# Patient Record
Sex: FEMALE | Race: WHITE | Employment: OTHER | URBAN - METROPOLITAN AREA
[De-identification: names, ages, dates, MRNs, and addresses within clinical notes are randomized per-mention and may not be internally consistent; named-entity substitution may affect disease eponyms.]

---

## 2017-08-26 ENCOUNTER — HOSPITAL ENCOUNTER (EMERGENCY)
Age: 67
Discharge: HOME | End: 2017-08-27
Attending: EMERGENCY MEDICINE | Admitting: EMERGENCY MEDICINE
Payer: MEDICARE

## 2017-08-26 DIAGNOSIS — R51.9 ACUTE NONINTRACTABLE HEADACHE, UNSPECIFIED HEADACHE TYPE: Primary | ICD-10-CM

## 2017-08-26 PROCEDURE — 96375 TX/PRO/DX INJ NEW DRUG ADDON: CPT

## 2017-08-26 PROCEDURE — 74011250636 HC RX REV CODE- 250/636: Performed by: EMERGENCY MEDICINE

## 2017-08-26 PROCEDURE — 74011000258 HC RX REV CODE- 258: Performed by: EMERGENCY MEDICINE

## 2017-08-26 PROCEDURE — 96361 HYDRATE IV INFUSION ADD-ON: CPT

## 2017-08-26 PROCEDURE — 99284 EMERGENCY DEPT VISIT MOD MDM: CPT

## 2017-08-26 PROCEDURE — 96365 THER/PROPH/DIAG IV INF INIT: CPT

## 2017-08-26 PROCEDURE — 74011000250 HC RX REV CODE- 250: Performed by: EMERGENCY MEDICINE

## 2017-08-26 RX ORDER — NIACIN 50 MG
TABLET ORAL
COMMUNITY

## 2017-08-26 RX ORDER — METOCLOPRAMIDE 5 MG/1
5 TABLET ORAL
COMMUNITY

## 2017-08-26 RX ORDER — CARVEDILOL 25 MG/1
25 TABLET ORAL 2 TIMES DAILY WITH MEALS
COMMUNITY

## 2017-08-26 RX ORDER — SERTRALINE HYDROCHLORIDE 50 MG/1
TABLET, FILM COATED ORAL DAILY
COMMUNITY

## 2017-08-26 RX ORDER — ATORVASTATIN CALCIUM 40 MG/1
40 TABLET, FILM COATED ORAL DAILY
COMMUNITY

## 2017-08-26 RX ORDER — SODIUM CHLORIDE 0.9 % (FLUSH) 0.9 %
5-10 SYRINGE (ML) INJECTION EVERY 8 HOURS
Status: DISCONTINUED | OUTPATIENT
Start: 2017-08-26 | End: 2017-08-27 | Stop reason: HOSPADM

## 2017-08-26 RX ORDER — BUMETANIDE 1 MG/1
TABLET ORAL DAILY
COMMUNITY

## 2017-08-26 RX ORDER — DEXAMETHASONE SODIUM PHOSPHATE 10 MG/ML
10 INJECTION INTRAMUSCULAR; INTRAVENOUS
Status: COMPLETED | OUTPATIENT
Start: 2017-08-26 | End: 2017-08-26

## 2017-08-26 RX ORDER — DIPHENHYDRAMINE HYDROCHLORIDE 50 MG/ML
25 INJECTION, SOLUTION INTRAMUSCULAR; INTRAVENOUS ONCE
Status: COMPLETED | OUTPATIENT
Start: 2017-08-26 | End: 2017-08-26

## 2017-08-26 RX ORDER — PROCHLORPERAZINE EDISYLATE 5 MG/ML
10 INJECTION INTRAMUSCULAR; INTRAVENOUS
Status: COMPLETED | OUTPATIENT
Start: 2017-08-26 | End: 2017-08-26

## 2017-08-26 RX ORDER — FLUOXETINE HYDROCHLORIDE 40 MG/1
40 CAPSULE ORAL DAILY
COMMUNITY

## 2017-08-26 RX ORDER — ALLOPURINOL 300 MG/1
TABLET ORAL DAILY
COMMUNITY

## 2017-08-26 RX ORDER — VALPROATE SODIUM 100 MG/ML
500 INJECTION INTRAVENOUS
Status: DISCONTINUED | OUTPATIENT
Start: 2017-08-26 | End: 2017-08-26 | Stop reason: CLARIF

## 2017-08-26 RX ORDER — SODIUM CHLORIDE 0.9 % (FLUSH) 0.9 %
5-10 SYRINGE (ML) INJECTION AS NEEDED
Status: DISCONTINUED | OUTPATIENT
Start: 2017-08-26 | End: 2017-08-27 | Stop reason: HOSPADM

## 2017-08-26 RX ORDER — KETOROLAC TROMETHAMINE 30 MG/ML
15 INJECTION, SOLUTION INTRAMUSCULAR; INTRAVENOUS
Status: COMPLETED | OUTPATIENT
Start: 2017-08-26 | End: 2017-08-26

## 2017-08-26 RX ORDER — ALPRAZOLAM 1 MG/1
TABLET ORAL
COMMUNITY

## 2017-08-26 RX ORDER — POTASSIUM CHLORIDE 1.5 G/1.77G
20 POWDER, FOR SOLUTION ORAL 2 TIMES DAILY
COMMUNITY

## 2017-08-26 RX ORDER — BUPROPION HYDROCHLORIDE 100 MG/1
TABLET ORAL
COMMUNITY

## 2017-08-26 RX ADMIN — Medication 10 ML: at 23:51

## 2017-08-26 RX ADMIN — PROCHLORPERAZINE EDISYLATE 10 MG: 5 INJECTION INTRAMUSCULAR; INTRAVENOUS at 21:58

## 2017-08-26 RX ADMIN — SODIUM CHLORIDE 1000 ML: 900 INJECTION, SOLUTION INTRAVENOUS at 21:52

## 2017-08-26 RX ADMIN — Medication 10 ML: at 21:58

## 2017-08-26 RX ADMIN — VALPROATE SODIUM 500 MG: 100 INJECTION, SOLUTION INTRAVENOUS at 22:38

## 2017-08-26 RX ADMIN — KETOROLAC TROMETHAMINE 15 MG: 30 INJECTION, SOLUTION INTRAMUSCULAR at 21:53

## 2017-08-26 RX ADMIN — DIPHENHYDRAMINE HYDROCHLORIDE 25 MG: 50 INJECTION, SOLUTION INTRAMUSCULAR; INTRAVENOUS at 21:55

## 2017-08-26 RX ADMIN — DEXAMETHASONE SODIUM PHOSPHATE 10 MG: 10 INJECTION, SOLUTION INTRAMUSCULAR; INTRAVENOUS at 23:50

## 2017-08-27 VITALS
TEMPERATURE: 98.3 F | WEIGHT: 250 LBS | RESPIRATION RATE: 18 BRPM | SYSTOLIC BLOOD PRESSURE: 112 MMHG | BODY MASS INDEX: 39.24 KG/M2 | HEART RATE: 72 BPM | DIASTOLIC BLOOD PRESSURE: 50 MMHG | HEIGHT: 67 IN | OXYGEN SATURATION: 96 %

## 2017-08-27 NOTE — ED PROVIDER NOTES
HPI Comments: 78 y.o. female with past medical history significant for cluster migraines, PNA who presents accompanied by  with chief complaint of headache. Patient complains of an intermittent migraine across her forehead and into her cheeks with some light sensitivity that has been ongoing for 2 weeks. Patient states she's been taking Topamax, Fioricet, and Hydrocodone without relief. Patient states she last took a Fioricet around 1730 and a Topamax around 1800. Patient states she's called her neurologist twice without success. Patient states she's also vomited a few times and is currently feeling nauseated. Patient states she had migraines a few years ago then recently was diagnosed with cluster migraines after a head CT with her neurologist. Patient states she had PNA 3 weeks ago. Patient denies numbness into her extremities or changes in her vision. There are no other acute medical concerns at this time. PCP: No primary care provider on file. Neurologist: Kiana Jones MD    Note written by Cliff Delgado, as dictated by Raffaele Keller MD 9:28 PM     The history is provided by the patient. No  was used. No past medical history on file. No past surgical history on file. No family history on file. Social History     Social History    Marital status:      Spouse name: N/A    Number of children: N/A    Years of education: N/A     Occupational History    Not on file. Social History Main Topics    Smoking status: Not on file    Smokeless tobacco: Not on file    Alcohol use Not on file    Drug use: Not on file    Sexual activity: Not on file     Other Topics Concern    Not on file     Social History Narrative    No narrative on file         ALLERGIES: Latex; Lisinopril; and Oxycodone    Review of Systems   Constitutional: Negative for appetite change, chills, fatigue and fever. HENT: Negative for congestion, rhinorrhea and sore throat. Eyes: Positive for photophobia. Negative for visual disturbance. Respiratory: Negative for cough and shortness of breath. Cardiovascular: Negative for chest pain and leg swelling. Gastrointestinal: Positive for nausea and vomiting. Negative for abdominal pain, constipation and diarrhea. Genitourinary: Negative for difficulty urinating and dysuria. Musculoskeletal: Negative for back pain and neck pain. Skin: Negative for rash and wound. Neurological: Positive for headaches. All other systems reviewed and are negative. Vitals:    08/26/17 2116   BP: (!) 196/92   Pulse: 72   Resp: 18   Temp: 98.3 °F (36.8 °C)   SpO2: 97%   Weight: 113.4 kg (250 lb)   Height: 5' 7\" (1.702 m)            Physical Exam   Constitutional: She is oriented to person, place, and time. She appears well-developed and well-nourished. Overweight  Appears uncomfortable   HENT:   Head: Normocephalic and atraumatic. Eyes: Conjunctivae are normal. No scleral icterus. Neck: Neck supple. No tracheal deviation present. Cardiovascular: Normal rate, regular rhythm, normal heart sounds and intact distal pulses. Exam reveals no gallop and no friction rub. No murmur heard. Pulmonary/Chest: Effort normal and breath sounds normal. She has no wheezes. She has no rales. Abdominal: Soft. She exhibits no distension. There is no tenderness. There is no rebound and no guarding. Musculoskeletal: She exhibits no edema. Neurological: She is alert and oriented to person, place, and time. She has normal strength. No cranial nerve deficit or sensory deficit. Skin: Skin is warm and dry. No rash noted. Psychiatric: She has a normal mood and affect. Nursing note and vitals reviewed. Note written by Cliff Garcia, as dictated by Cecil Wang MD 9:29 PM      [facility]  ED Course       Procedures    10:19 PM  Change of shift. Care of patient to be signed over to Dr. Jaya Keenan.  Cecil Wang MD  10:19 PM    A/P: migraine with hx same - on Topamax at home; also has tried Fioricet and hydrocodone; has not gotten relief yet from Compazine, Benadryl, Toradol. I've ordered Depacon. .  Reassuring exam.  Jaime Iglesias MD  10:21 PM

## 2017-08-27 NOTE — ED TRIAGE NOTES
Pt presents with migraine x2 weeks that is worsening. Reports that she has had no relief with Topamax, Fioricet, or hydrocodone.

## 2017-08-27 NOTE — DISCHARGE INSTRUCTIONS
Headache: Care Instructions  Your Care Instructions    Headaches have many possible causes. Most headaches aren't a sign of a more serious problem, and they will get better on their own. Home treatment may help you feel better faster. The doctor has checked you carefully, but problems can develop later. If you notice any problems or new symptoms, get medical treatment right away. Follow-up care is a key part of your treatment and safety. Be sure to make and go to all appointments, and call your doctor if you are having problems. It's also a good idea to know your test results and keep a list of the medicines you take. How can you care for yourself at home? · Do not drive if you have taken a prescription pain medicine. · Rest in a quiet, dark room until your headache is gone. Close your eyes and try to relax or go to sleep. Don't watch TV or read. · Put a cold, moist cloth or cold pack on the painful area for 10 to 20 minutes at a time. Put a thin cloth between the cold pack and your skin. · Use a warm, moist towel or a heating pad set on low to relax tight shoulder and neck muscles. · Have someone gently massage your neck and shoulders. · Take pain medicines exactly as directed. ¨ If the doctor gave you a prescription medicine for pain, take it as prescribed. ¨ If you are not taking a prescription pain medicine, ask your doctor if you can take an over-the-counter medicine. · Be careful not to take pain medicine more often than the instructions allow, because you may get worse or more frequent headaches when the medicine wears off. · Do not ignore new symptoms that occur with a headache, such as a fever, weakness or numbness, vision changes, or confusion. These may be signs of a more serious problem. To prevent headaches  · Keep a headache diary so you can figure out what triggers your headaches. Avoiding triggers may help you prevent headaches.  Record when each headache began, how long it lasted, and what the pain was like (throbbing, aching, stabbing, or dull). Write down any other symptoms you had with the headache, such as nausea, flashing lights or dark spots, or sensitivity to bright light or loud noise. Note if the headache occurred near your period. List anything that might have triggered the headache, such as certain foods (chocolate, cheese, wine) or odors, smoke, bright light, stress, or lack of sleep. · Find healthy ways to deal with stress. Headaches are most common during or right after stressful times. Take time to relax before and after you do something that has caused a headache in the past.  · Try to keep your muscles relaxed by keeping good posture. Check your jaw, face, neck, and shoulder muscles for tension, and try relaxing them. When sitting at a desk, change positions often, and stretch for 30 seconds each hour. · Get plenty of sleep and exercise. · Eat regularly and well. Long periods without food can trigger a headache. · Treat yourself to a massage. Some people find that regular massages are very helpful in relieving tension. · Limit caffeine by not drinking too much coffee, tea, or soda. But don't quit caffeine suddenly, because that can also give you headaches. · Reduce eyestrain from computers by blinking frequently and looking away from the computer screen every so often. Make sure you have proper eyewear and that your monitor is set up properly, about an arm's length away. · Seek help if you have depression or anxiety. Your headaches may be linked to these conditions. Treatment can both prevent headaches and help with symptoms of anxiety or depression. When should you call for help? Call 911 anytime you think you may need emergency care. For example, call if:  · You have signs of a stroke. These may include:  ¨ Sudden numbness, paralysis, or weakness in your face, arm, or leg, especially on only one side of your body. ¨ Sudden vision changes.   ¨ Sudden trouble speaking. ¨ Sudden confusion or trouble understanding simple statements. ¨ Sudden problems with walking or balance. ¨ A sudden, severe headache that is different from past headaches. Call your doctor now or seek immediate medical care if:  · You have a new or worse headache. · Your headache gets much worse. Where can you learn more? Go to http://rudolph-jesusita.info/. Enter M271 in the search box to learn more about \"Headache: Care Instructions. \"  Current as of: October 14, 2016  Content Version: 11.3  © 8046-9223 [employer]. Care instructions adapted under license by [employer] (which disclaims liability or warranty for this information). If you have questions about a medical condition or this instruction, always ask your healthcare professional. [address] any warranty or liability for your use of this information. We hope that we have addressed all of your medical concerns. The examination and treatment you received in the Emergency Department were for an emergent problem and were not intended as complete care. It is important that you follow up with your healthcare provider(s) for ongoing care. If your symptoms worsen or do not improve as expected, and you are unable to reach your usual health care provider(s), you should return to the Emergency Department. Today's healthcare is undergoing tremendous change, and patient satisfaction surveys are one of the many tools to assess the quality of medical care. You may receive a survey from the [employer] regarding your experience in the Emergency Department. I hope that your experience has been completely positive, particularly the medical care that I provided. As such, please participate in the survey; anything less than excellent does not meet my expectations or intentions.         [facility] and [address] [place] [address] participate in nationally recognized quality of care measures. If your blood pressure is greater than 120/80, as reported below, we urge that you seek medical care to address the potential of high blood pressure, commonly known as hypertension. Hypertension can be hereditary or can be caused by certain medical conditions, pain, stress, or \"white coat syndrome. \"       Please make an appointment with your health care provider(s) for follow up of your Emergency Department visit. VITALS:   Patient Vitals for the past 8 hrs:   Temp Pulse Resp BP SpO2   08/26/17 2300 - - - 126/65 96 %   08/26/17 2230 - - - 124/63 96 %   08/26/17 2116 98.3 °F (36.8 °C) 72 18 (!) 196/92 97 %          Thank you for allowing us to provide you with medical care today. We realize that you have many choices for your emergency care needs. Please choose us in the future for any continued health care needs. Rom Gutiérrez [facility], [address] [place] [address] Avenue: [phone]            No results found for this or any previous visit (from the past 24 hour(s)). No results found.

## 2017-09-06 ENCOUNTER — OFFICE VISIT (OUTPATIENT)
Dept: ONCOLOGY | Age: 67
End: 2017-09-06

## 2017-09-06 ENCOUNTER — HOSPITAL ENCOUNTER (OUTPATIENT)
Dept: GENERAL RADIOLOGY | Age: 67
Discharge: HOME | End: 2017-09-06
Payer: MEDICARE

## 2017-09-06 VITALS
OXYGEN SATURATION: 97 % | DIASTOLIC BLOOD PRESSURE: 66 MMHG | BODY MASS INDEX: 40.46 KG/M2 | WEIGHT: 257.8 LBS | SYSTOLIC BLOOD PRESSURE: 150 MMHG | HEART RATE: 67 BPM | HEIGHT: 67 IN

## 2017-09-06 DIAGNOSIS — N18.9 CKD (CHRONIC KIDNEY DISEASE), UNSPECIFIED STAGE: ICD-10-CM

## 2017-09-06 DIAGNOSIS — D47.2 MGUS (MONOCLONAL GAMMOPATHY OF UNKNOWN SIGNIFICANCE): Primary | ICD-10-CM

## 2017-09-06 DIAGNOSIS — R80.9 PROTEINURIA, UNSPECIFIED TYPE: ICD-10-CM

## 2017-09-06 DIAGNOSIS — D47.2 MGUS (MONOCLONAL GAMMOPATHY OF UNKNOWN SIGNIFICANCE): ICD-10-CM

## 2017-09-06 PROCEDURE — 77075 RADEX OSSEOUS SURVEY COMPL: CPT

## 2017-09-06 RX ORDER — AMLODIPINE BESYLATE 10 MG/1
TABLET ORAL
COMMUNITY
Start: 2017-07-15

## 2017-09-06 RX ORDER — BUPROPION HYDROCHLORIDE 100 MG/1
TABLET, EXTENDED RELEASE ORAL
COMMUNITY
Start: 2017-07-11

## 2017-09-06 RX ORDER — CARVEDILOL 25 MG/1
TABLET ORAL
COMMUNITY
Start: 2017-06-18

## 2017-09-06 RX ORDER — ATORVASTATIN CALCIUM 40 MG/1
TABLET, FILM COATED ORAL DAILY
COMMUNITY

## 2017-09-06 RX ORDER — ALLOPURINOL 300 MG/1
TABLET ORAL
COMMUNITY
Start: 2017-06-18

## 2017-09-06 RX ORDER — ALPRAZOLAM 1 MG/1
TABLET ORAL
COMMUNITY
Start: 2017-06-25

## 2017-09-06 RX ORDER — TOPIRAMATE 25 MG/1
TABLET ORAL
COMMUNITY
Start: 2017-06-25 | End: 2019-03-22 | Stop reason: ALTCHOICE

## 2017-09-06 RX ORDER — ACYCLOVIR 400 MG/1
TABLET ORAL
COMMUNITY
Start: 2017-07-13

## 2017-09-06 RX ORDER — FLUOXETINE HYDROCHLORIDE 40 MG/1
40 CAPSULE ORAL DAILY
COMMUNITY

## 2017-09-06 RX ORDER — OMEPRAZOLE 40 MG/1
CAPSULE, DELAYED RELEASE ORAL
COMMUNITY
Start: 2017-07-15

## 2017-09-06 NOTE — MR AVS SNAPSHOT
Visit Information Date & Time Provider Department Dept. Phone Encounter #  
 9/6/2017  2:00 PM Nate Ortiz MD [facility] at [address] [place] [address] Rd [id number] Follow-up Instructions Return for Raddin, lab results, MGUS fu.  
  
Your Appointments 9/22/2017  2:45 PM  
ESTABLISHED PATIENT with [facility]) Appt Note: Raddin, lab results, MGUS fu.  
 [address] [facility], [address] [facility] [id number]  
[phone]  
  
   
 [address] [facility], [address] Upcoming Health Maintenance Date Due Hepatitis C Screening 1950 DTaP/Tdap/Td series (1 - Tdap) 2/17/1972 BREAST CANCER SCRN MAMMOGRAM 2/17/2001 FOBT Q 1 YEAR AGE 50-75 2/17/2001 ZOSTER VACCINE AGE 60> 12/17/2010 GLAUCOMA SCREENING Q2Y 2/17/2016 OSTEOPOROSIS SCREENING (DEXA) 2/17/2016 Pneumococcal 65+ Low/Medium Risk (1 of 2 - PCV13) 2/17/2016 MEDICARE YEARLY EXAM 2/17/2016 INFLUENZA AGE 9 TO ADULT 8/1/2017 Allergies as of 9/6/2017  Review Complete On: 9/6/2017 By: Joy Peña LPN Severity Noted Reaction Type Reactions Latex  09/06/2017    Rash Lisinopril  09/06/2017    Swelling Reports swelling in mouth Current Immunizations  Never Reviewed No immunizations on file. Not reviewed this visit You Were Diagnosed With   
  
 Codes Comments MGUS (monoclonal gammopathy of unknown significance)    -  Primary ICD-10-CM: T61.8 ICD-9-CM: 273.1 CKD (chronic kidney disease), unspecified stage     ICD-10-CM: N18.9 ICD-9-CM: 329. 9 Proteinuria, unspecified type     ICD-10-CM: R80.9 ICD-9-CM: 791.0 Vitals BP Pulse Height(growth percentile) Weight(growth percentile) SpO2  
  
 150/66 (BP 1 Location: Right arm, BP Patient Position: Sitting) 67 5' 7\" (1.702 m) 257 lb 12.8 oz (116.9 kg) 97% BMI OB Status Smoking Status 40.38 kg/m2 Hysterectomy Never Smoker Vitals History BMI and BSA Data Body Mass Index Body Surface Area  
 40.38 kg/m 2 2.35 m 2 Your Updated Medication List  
  
   
This list is accurate as of: 9/6/17  3:12 PM.  Always use your most recent med list.  
  
  
  
  
 acyclovir 400 mg tablet Commonly known as:  ZOVIRAX  
  
 allopurinol 300 mg tablet Commonly known as:  Leigh Fulling ALPRAZolam 1 mg tablet Commonly known as:  XANAX  
  
 amLODIPine 10 mg tablet Commonly known as:  NORVASC  
  
 atorvastatin 40 mg tablet Commonly known as:  LIPITOR Take  by mouth daily. buPROPion  mg SR tablet Commonly known as:  WELLBUTRIN SR  
  
 carvedilol 25 mg tablet Commonly known as:  COREG  
  
 NIACIN PO Take  by mouth. omeprazole 40 mg capsule Commonly known as:  PRILOSEC PROzac 40 mg capsule Generic drug:  FLUoxetine Take 40 mg by mouth daily. topiramate 25 mg tablet Commonly known as:  TOPAMAX We Performed the Following BETA-2 MICROGLOBULIN [id number] CPT(R)] CBC WITH AUTOMATED DIFF [28190 CPT(R)] GAMMOPATHY EVAL, SPEP/LUZ MARIA, IG QT/FLC [[id number] Custom] LD [67792 CPT(R)] PATHOLOGIST REVIEW SMEARS [[id number] Custom] PROTEIN ELECTROPHORESIS + LUZ MARIA, UR, 24HR [id number] CPT(R)] RENAL FUNCTION PANEL [48038 CPT(R)] Follow-up Instructions Return for Raddin, lab results, MGUS fu.  
  
To-Do List   
 Around 09/06/2017 Imaging:  XR BONE SURVEY COMP [facility] [place] & HE[facility] SERVICES! [employer] introduces [employer] patient portal. Now you can access parts of your medical record, email your doctor's office, and request medication refills online. 1. In your internet browser, go to [email or web address] 2. Click on the First Time User? Click Here link in the Sign In box. You will see the New Member Sign Up page. 3. Enter your [employer] Access Code exactly as it appears below. You will not need to use this code after youve completed the sign-up process. If you do not sign up before the expiration date, you must request a new code. · Flasma Access Code: [email or web address] Expires: 12/5/2017  3:12 PM 
 
4. Enter the last four digits of your Social Security Number (xxxx) and Date of Birth (mm/dd/yyyy) as indicated and click Submit. You will be taken to the next sign-up page. 5. Create a Flasma ID. This will be your Flasma login ID and cannot be changed, so think of one that is secure and easy to remember. 6. Create a Flasma password. You can change your password at any time. 7. Enter your Password Reset Question and Answer. This can be used at a later time if you forget your password. 8. Enter your e-mail address. You will receive e-mail notification when new information is available in [address]. 9. Click Sign Up. You can now view and download portions of your medical record. 10. Click the Download Summary menu link to download a portable copy of your medical information. If you have questions, please visit the Frequently Asked Questions section of the Flasma website. Remember, Flasma is NOT to be used for urgent needs. For medical emergencies, dial 911. Now available from your iPhone and Android! Please provide this summary of care documentation to your next provider. If you have any questions after today's visit, please call [phone].

## 2017-09-06 NOTE — PROGRESS NOTES
[address] [facility] at [facility]  [phone]    Hematology / Oncology Consult    Reason for Visit:   Abby Morales is a 78 y.o. female who is seen in consultation at the request of Dr. Oleta Canavan for evaluation of MGUS. History of Present Illness:   Abby Morales is a pleasant 78 y.o. female who presents today for evaluation of MGUS. She reports having issues with recurrent UTIs, for which she was referred to urology. From there she was sent to nephrology, for evaluation of a mildly elevated creatinine. As part of that evaluation, she was noted to have proteinuria, as well as a positive LUZ MARIA. She reports chronic fatigue for the past several months, or perhaps years per daughter. Some mild sore throat in the past week. She had pneumonia about a month ago. She reports a \"low grade fever\" at home, as well as intermittent chills, but no night sweats or unexplained weight loss. Some chronic back and arthritis pains. She is having some migraines. Undergoing a lot of family stress at home currently. She is accompanied by her daughter.       Past Medical History:   Diagnosis Date    Acid reflux     Constipation     Depression     Dizziness     Headache     Hypertension     Joint pain     Joint swelling     Sinus problem     SOB (shortness of breath)     Swallowing difficulty       Past Surgical History:   Procedure Laterality Date    HX HYSTERECTOMY  2010    HX KNEE REPLACEMENT Bilateral 2008    HX ROTATOR CUFF REPAIR  2015      Social History   Substance Use Topics    Smoking status: Never Smoker    Smokeless tobacco: Never Used    Alcohol use No      Family History   Problem Relation Age of Onset    Other Sister      Brain Tumor    Arthritis-osteo Paternal [de-identified]     Stroke Maternal Grandmother     Other Daughter      AML 2010     Current Outpatient Prescriptions   Medication Sig    carvedilol (COREG) 25 mg tablet     buPROPion SR (WELLBUTRIN SR) 100 mg SR tablet     amLODIPine (NORVASC) 10 mg tablet     ALPRAZolam (XANAX) 1 mg tablet     allopurinol (ZYLOPRIM) 300 mg tablet     acyclovir (ZOVIRAX) 400 mg tablet     omeprazole (PRILOSEC) 40 mg capsule     topiramate (TOPAMAX) 25 mg tablet     atorvastatin (LIPITOR) 40 mg tablet Take  by mouth daily.  NIACIN PO Take  by mouth.  FLUoxetine (PROZAC) 40 mg capsule Take 40 mg by mouth daily. No current facility-administered medications for this visit. Allergies   Allergen Reactions    Latex Rash    Lisinopril Swelling     Reports swelling in mouth        Review of Systems: A complete review of systems was obtained, negative except as described above. Physical Exam:     Visit Vitals    /66 (BP 1 Location: Right arm, BP Patient Position: Sitting)    Pulse 67    Ht 5' 7\" (1.702 m)    Wt 257 lb 12.8 oz (116.9 kg)    LMP Comment: 2000    SpO2 97%    BMI 40.38 kg/m2     General: No distress  Eyes: PERRLA, anicteric sclerae  HENT: Atraumatic, OP clear  Neck: Supple  Lymphatic: No cervical, supraclavicular, or inguinal adenopathy  Respiratory: CTAB, normal respiratory effort  CV: Normal rate, regular rhythm, no murmurs, no peripheral edema  GI: Soft, nontender, nondistended, no masses, no hepatomegaly, no splenomegaly  MS: Normal gait and station. Digits without clubbing or cyanosis. Skin: No rashes, ecchymoses, or petechiae. Normal temperature, turgor, and texture. Psych: Alert, oriented, appropriate affect, normal judgment/insight    Results:     5/9/2017  Creatinine: 1.31  Calcium: 9.3    7/19/2017  UA: protein 2+  Free kappa light chains: 20.4 (H)  Free lambda light chains: 23.8 (H)  Light chain ratio: 0.86 (normal)  Immunoglobulins: normal  LUZ MARIA: IgG monoclonal protein with lambda light chain specificity  Creatinine: 1.26  Calcium: 9.7      Records reviewed and summarized above. Assessment:   1) MGUS, IgG  Positive LUZ MARIA on evaluation for mild CKD.   Total immunoglobulin levels, as well as free light chain ratio, are normal.  However, she does have protein in her urine. We discussed the significance of this finding at length today. In the majority of cases, MGUS is a benign finding and affects approximately 1-3% of the [place] population. For most of these patients it has no clinical significance, and can simply be followed over time. However, in some cases MGUS is reflective of an underlying plasma cell dyscrasia, such as Multiple Myeloma, Waldenstrom macroglobulinemia, or Amyloidosis. I will obtain some studies today to help evaluate for these possibilities, to include CBC with diff, smear review, CMP, SPEP/LUZ MARIA, serum free light chains, 24-hour UPEP/LUZ MARIA, LDH, and beta-2-microglobulin. Additionally I will obtain a bone survey to evaluate for possible lytic bone lesions. 2) CKD  Mild, following with nephrology    3) Proteinuria  Check UPEP, as above      Plan:     · Labs  · 24 hour UPEP  · Bone survey  · Return to see me to review results    I appreciate the opportunity to participate in Ms. Bold[facility].     Signed By: Jah Acosta MD     September 6, 2017

## 2017-09-06 NOTE — PROGRESS NOTES
Identified pt with two pt identifiers(name and ). Reviewed record in preparation for visit and have obtained necessary documentation. Chief Complaint   Patient presents with    Other     Myeloma,new patient        Health Maintenance Due   Topic    Hepatitis C Screening     DTaP/Tdap/Td series (1 - Tdap)    BREAST CANCER SCRN MAMMOGRAM     FOBT Q 1 YEAR AGE 50-75     ZOSTER VACCINE AGE 60>     GLAUCOMA SCREENING Q2Y     OSTEOPOROSIS SCREENING (DEXA)     Pneumococcal 65+ Low/Medium Risk (1 of 2 - PCV13)    MEDICARE YEARLY EXAM     INFLUENZA AGE 9 TO ADULT      HM reviewed w/patient    Coordination of Care Questionnaire:  :   1) Have you been to an emergency room, urgent care clinic since your last visit? no   Hospitalized since your last visit? no             2. Have seen or consulted any other health care provider since your last visit? NO  If yes, where when, and reason for visit? 3) Do you have an Advanced Directive/ Living Will in place? NO  If yes, do we have a copy on file N/A  If no, would you like information NO    Patient is accompanied by daughter I have received verbal consent from Sha Mazariegos to discuss any/all medical information while they are present in the room.

## 2017-09-12 LAB
ALBUMIN SERPL ELPH-MCNC: 3.5 G/DL (ref 2.9–4.4)
ALBUMIN SERPL-MCNC: 3.8 G/DL (ref 3.6–4.8)
ALBUMIN/GLOB SERPL: 1.3 {RATIO} (ref 0.7–1.7)
ALPHA1 GLOB SERPL ELPH-MCNC: 0.2 G/DL (ref 0–0.4)
ALPHA2 GLOB SERPL ELPH-MCNC: 1 G/DL (ref 0.4–1)
B-GLOBULIN SERPL ELPH-MCNC: 0.9 G/DL (ref 0.7–1.3)
B2 MICROGLOB SERPL-MCNC: 2.7 MG/L (ref 0.6–2.4)
BASOPHILS # BLD AUTO: 0 X10E3/UL (ref 0–0.2)
BASOPHILS NFR BLD AUTO: 1 %
BUN SERPL-MCNC: 16 MG/DL (ref 8–27)
BUN/CREAT SERPL: 16 (ref 12–28)
CALCIUM SERPL-MCNC: 9 MG/DL (ref 8.7–10.3)
CHLORIDE SERPL-SCNC: 102 MMOL/L (ref 96–106)
CO2 SERPL-SCNC: 22 MMOL/L (ref 18–29)
CREAT SERPL-MCNC: 0.97 MG/DL (ref 0.57–1)
EOSINOPHIL # BLD AUTO: 0.2 X10E3/UL (ref 0–0.4)
EOSINOPHIL NFR BLD AUTO: 3 %
ERYTHROCYTE [DISTWIDTH] IN BLOOD BY AUTOMATED COUNT: 15.2 % (ref 12.3–15.4)
GAMMA GLOB SERPL ELPH-MCNC: 0.6 G/DL (ref 0.4–1.8)
GLOBULIN SER-MCNC: 2.7 G/DL (ref 2.2–3.9)
GLUCOSE SERPL-MCNC: 97 MG/DL (ref 65–99)
HCT VFR BLD AUTO: 34.2 % (ref 34–46.6)
HGB BLD-MCNC: 11.4 G/DL (ref 11.1–15.9)
IGA SERPL-MCNC: 138 MG/DL (ref 87–352)
IGG SERPL-MCNC: 624 MG/DL (ref 700–1600)
IGM SERPL-MCNC: 106 MG/DL (ref 26–217)
IMM GRANULOCYTES # BLD: 0 X10E3/UL (ref 0–0.1)
IMM GRANULOCYTES NFR BLD: 0 %
INTERPRETATION SERPL IEP-IMP: ABNORMAL
KAPPA LC FREE SER-MCNC: 17.6 MG/L (ref 3.3–19.4)
KAPPA LC FREE/LAMBDA FREE SER: 0.75 {RATIO} (ref 0.26–1.65)
LAMBDA LC FREE SERPL-MCNC: 23.4 MG/L (ref 5.7–26.3)
LDH SERPL-CCNC: 312 IU/L (ref 119–226)
LYMPHOCYTES # BLD AUTO: 2.4 X10E3/UL (ref 0.7–3.1)
LYMPHOCYTES NFR BLD AUTO: 41 %
M PROTEIN SERPL ELPH-MCNC: 0.2 G/DL
MCH RBC QN AUTO: 28.6 PG (ref 26.6–33)
MCHC RBC AUTO-ENTMCNC: 33.3 G/DL (ref 31.5–35.7)
MCV RBC AUTO: 86 FL (ref 79–97)
MONOCYTES # BLD AUTO: 0.5 X10E3/UL (ref 0.1–0.9)
MONOCYTES NFR BLD AUTO: 9 %
NEUTROPHILS # BLD AUTO: 2.7 X10E3/UL (ref 1.4–7)
NEUTROPHILS NFR BLD AUTO: 46 %
PHOSPHATE SERPL-MCNC: 4.2 MG/DL (ref 2.5–4.5)
PLATELET # BLD AUTO: 281 X10E3/UL (ref 150–379)
PLEASE NOTE:, 149534: ABNORMAL
POTASSIUM SERPL-SCNC: 4.6 MMOL/L (ref 3.5–5.2)
PROT SERPL-MCNC: 6.2 G/DL (ref 6–8.5)
RBC # BLD AUTO: 3.99 X10E6/UL (ref 3.77–5.28)
SODIUM SERPL-SCNC: 140 MMOL/L (ref 134–144)
WBC # BLD AUTO: 5.8 X10E3/UL (ref 3.4–10.8)

## 2017-09-14 ENCOUNTER — TELEPHONE (OUTPATIENT)
Dept: ONCOLOGY | Age: 67
End: 2017-09-14

## 2017-09-14 LAB
ALBUMIN 24H MFR UR ELPH: 35.5 %
ALPHA1 GLOB 24H MFR UR ELPH: 9.3 %
ALPHA2 GLOB 24H MFR UR ELPH: 18.3 %
B-GLOBULIN MFR UR ELPH: 25.6 %
GAMMA GLOB 24H MFR UR ELPH: 11.2 %
INTERPRETATION UR IFE-IMP: ABNORMAL
M PROTEIN 24H MFR UR ELPH: ABNORMAL %
NOTE, 149533: ABNORMAL
PROT 24H UR-MRATE: 155 MG/24 HR (ref 30–150)
PROT UR-MCNC: 9.1 MG/DL

## 2017-09-14 NOTE — TELEPHONE ENCOUNTER
Pts daughter called and left a voicemail stating her [facility] is concerned because pt is experiencing severe back pain and a fever.  Call back number for Waqar Hancock is [phone]

## 2017-09-14 NOTE — TELEPHONE ENCOUNTER
[employer]  Medical Oncology at [address] [place] [address]    09/14/17- Voicemail left for patient's daughter requesting a return call when available. 3:58 PM- Spoke to patient, stated she went to an Urgent Care last night and confirmed that she does have a UTI. Stated they started her on bactrim for this. Encouraged patient to push fluids, rest, and call back with any other questions or concerns. She verbalized understanding. Patient stated she dropped off the 24 hour urine sample on Tuesday, results are not in the computer yet.

## 2017-09-22 ENCOUNTER — OFFICE VISIT (OUTPATIENT)
Dept: ONCOLOGY | Age: 67
End: 2017-09-22

## 2017-09-22 VITALS
RESPIRATION RATE: 20 BRPM | WEIGHT: 255 LBS | HEIGHT: 67 IN | OXYGEN SATURATION: 98 % | SYSTOLIC BLOOD PRESSURE: 143 MMHG | BODY MASS INDEX: 40.02 KG/M2 | DIASTOLIC BLOOD PRESSURE: 72 MMHG | TEMPERATURE: 96.7 F

## 2017-09-22 DIAGNOSIS — N39.0 FREQUENT UTI: ICD-10-CM

## 2017-09-22 DIAGNOSIS — R06.09 DYSPNEA ON EXERTION: ICD-10-CM

## 2017-09-22 DIAGNOSIS — R53.83 FATIGUE, UNSPECIFIED TYPE: ICD-10-CM

## 2017-09-22 DIAGNOSIS — D47.2 MGUS (MONOCLONAL GAMMOPATHY OF UNKNOWN SIGNIFICANCE): Primary | ICD-10-CM

## 2017-09-22 NOTE — PROGRESS NOTES
[id number] [facility] at [address] [place] [address]  [phone]    Hematology / Oncology Followup    Reason for Visit:   Chang Jones is a 78 y.o. female who is seen for follow up of MGUS. History of Present Illness:   Here today for follow up. Just finished antibiotic for another UTI. Fatigue persists. Shortness of breath with exertion persists. Chronic pain unchanged. No new complaints. She is accompanied by her daughter today. PAST HISTORY: The following sections were reviewed and updated in the EMR as appropriate: PMH, SH, FH, Medications, Allergies. Allergies   Allergen Reactions    Latex Rash    Lisinopril Cough    Lisinopril Swelling     Reports swelling in mouth    Oxycodone Nausea and Vomiting      Review of Systems: A complete review of systems was obtained, reviewed, and scanned into the EMR. Pertinent findings reviewed above. Physical Exam:     Visit Vitals    /72 (BP 1 Location: Left arm, BP Patient Position: Sitting)  Comment: .  Temp 96.7 °F (35.9 °C) (Temporal)    Resp 20    Ht 5' 7\" (1.702 m)    Wt 255 lb (115.7 kg)    SpO2 98%    BMI 39.94 kg/m2     General: No distress  Eyes: PERRLA, anicteric sclerae  HENT: Atraumatic, OP clear  Neck: Supple  Lymphatic: No cervical, supraclavicular, or inguinal adenopathy  Respiratory: CTAB, normal respiratory effort  CV: Normal rate, regular rhythm, no murmurs, no peripheral edema  GI: Soft, nontender, nondistended, no masses, no hepatomegaly, no splenomegaly  MS: Normal gait and station. Digits without clubbing or cyanosis. Skin: No rashes, ecchymoses, or petechiae. Normal temperature, turgor, and texture. Psych: Alert, oriented, appropriate affect, normal judgment/insight    Results:     Lab Results   Component Value Date/Time    WBC 5.8 09/07/2017 02:32 PM    HGB 11.4 09/07/2017 02:32 PM    HCT 34.2 09/07/2017 02:32 PM    PLATELET 827 13/06/6114 02:32 PM    MCV 86 09/07/2017 02:32 PM    ABS. NEUTROPHILS 2.7 09/07/2017 02:32 PM     Lab Results   Component Value Date/Time    Sodium 140 09/07/2017 02:32 PM    Potassium 4.6 09/07/2017 02:32 PM    Chloride 102 09/07/2017 02:32 PM    CO2 22 09/07/2017 02:32 PM    Glucose 97 09/07/2017 02:32 PM    BUN 16 09/07/2017 02:32 PM    Creatinine 0.97 09/07/2017 02:32 PM    GFR est AA 70 09/07/2017 02:32 PM    GFR est non-AA 61 09/07/2017 02:32 PM    Calcium 9.0 09/07/2017 02:32 PM     Lab Results   Component Value Date/Time    Bilirubin, total 0.3 05/31/2009 09:15 PM    ALT (SGPT) 40 05/31/2009 09:15 PM    AST (SGOT) 9 05/31/2009 09:15 PM    Alk. phosphatase 92 05/31/2009 09:15 PM    Protein, total 6.2 09/07/2017 02:32 PM    Albumin 3.8 09/07/2017 02:32 PM    Globulin 3.6 05/31/2009 09:15 PM     Lab Results   Component Value Date/Time    Vitamin B12 303 06/02/2009 03:20 AM     09/07/2017 02:32 PM    Beta-2 Microglobulin, serum 2.7 09/07/2017 02:32 PM    TSH 2.43 06/02/2009 03:20 AM    M-Fitz 0.2 09/07/2017 02:32 PM     Lab Results   Component Value Date/Time    INR 1.0 05/31/2009 09:15 PM    aPTT 25.9 05/31/2009 09:15 PM     M-SPIKE  Recent Labs      09/07/17   1432   PE6T  0.2*       Free Kappa Light Chains  Recent Labs      09/07/17   1432   KLFL1L  17.6       Free Lambda Light Chains  Recent Labs      09/07/17   1432   KLFL2L  23.4       Light Chain Ratio  Recent Labs      09/07/17   1432   KLFL3L  0.75       UPEP M-spike  Recent Labs      09/11/17   1000   IEU8L  Not Observed     5/9/2017  Creatinine: 1.31  Calcium: 9.3    7/19/2017  UA: protein 2+  Free kappa light chains: 20.4 (H)  Free lambda light chains: 23.8 (H)  Light chain ratio: 0.86 (normal)  Immunoglobulins: normal  LUZ MARIA: IgG monoclonal protein with lambda light chain specificity  Creatinine: 1.26  Calcium: 9.7        Assessment:   1) MGUS, IgG  Labs show IgG M-spike 0.2. UPEP and serum free light chains are normal. Bone survey normal.  No anemia, hypercalcemia, or significant renal dysfunction. This is likely a benign MGUS. With low-level IgG m-spike and no other abnormalities, a bone marrow biopsy is not indicated. However, she is at risk for progression to myeloma in the future (1% per year), and her labs should be followed. I will check labs again in 6 months, and if stable we can reduce to yearly checks. If labs worsening or symptoms develop that are concerning for myeloma, we will get a bone marrow biopsy. 2) CKD  Resolved on recent check. Following with nephrology. 3) Proteinuria  Normal UPEP. Nephrology following. 4) Recurrent UTI  Does not appear to be a hematologic issue. I recommend she follow up with urology as planned. 5) Fatigue  Not likely to be related to MGUS. I recommend she follow up with her PCP and cardiologist.     6) Shortness of breath  Not likely to be related to MGUS.  I recommend she follow up with her PCP and cardiologist.       Plan:     · Labs in 6 months: CBC, Renal function panel, SPEP, FLC, spot UPEP ([facility])  · Return to clinic in 6 months      Signed By: Lorenzo Moreno MD     September 22, 2017

## 2018-03-08 LAB
ALBUMIN MFR UR ELPH: 29.9 %
ALBUMIN SERPL ELPH-MCNC: 3.9 G/DL (ref 2.9–4.4)
ALBUMIN SERPL-MCNC: 4.2 G/DL (ref 3.6–4.8)
ALBUMIN/GLOB SERPL: 1.3 {RATIO} (ref 0.7–1.7)
ALPHA1 GLOB MFR UR ELPH: 3.2 %
ALPHA1 GLOB SERPL ELPH-MCNC: 0.3 G/DL (ref 0–0.4)
ALPHA2 GLOB MFR UR ELPH: 12.5 %
ALPHA2 GLOB SERPL ELPH-MCNC: 0.7 G/DL (ref 0.4–1)
B-GLOBULIN MFR UR ELPH: 35.4 %
B-GLOBULIN SERPL ELPH-MCNC: 1.2 G/DL (ref 0.7–1.3)
BASOPHILS # BLD AUTO: 0 X10E3/UL (ref 0–0.2)
BASOPHILS NFR BLD AUTO: 1 %
BUN SERPL-MCNC: 13 MG/DL (ref 8–27)
BUN/CREAT SERPL: 10 (ref 12–28)
CALCIUM SERPL-MCNC: 9.4 MG/DL (ref 8.7–10.3)
CHLORIDE SERPL-SCNC: 104 MMOL/L (ref 96–106)
CO2 SERPL-SCNC: 25 MMOL/L (ref 18–29)
CREAT SERPL-MCNC: 1.27 MG/DL (ref 0.57–1)
EOSINOPHIL # BLD AUTO: 0.2 X10E3/UL (ref 0–0.4)
EOSINOPHIL NFR BLD AUTO: 3 %
ERYTHROCYTE [DISTWIDTH] IN BLOOD BY AUTOMATED COUNT: 15.1 % (ref 12.3–15.4)
GAMMA GLOB MFR UR ELPH: 18.9 %
GAMMA GLOB SERPL ELPH-MCNC: 0.8 G/DL (ref 0.4–1.8)
GFR SERPLBLD CREATININE-BSD FMLA CKD-EPI: 44 ML/MIN/1.73
GFR SERPLBLD CREATININE-BSD FMLA CKD-EPI: 50 ML/MIN/1.73
GLOBULIN SER CALC-MCNC: 2.9 G/DL (ref 2.2–3.9)
GLUCOSE SERPL-MCNC: 109 MG/DL (ref 65–99)
HCT VFR BLD AUTO: 36.7 % (ref 34–46.6)
HGB BLD-MCNC: 12.2 G/DL (ref 11.1–15.9)
IMM GRANULOCYTES # BLD: 0 X10E3/UL (ref 0–0.1)
IMM GRANULOCYTES NFR BLD: 0 %
KAPPA LC FREE SER-MCNC: 18.9 MG/L (ref 3.3–19.4)
KAPPA LC FREE/LAMBDA FREE SER: 0.81 {RATIO} (ref 0.26–1.65)
LAMBDA LC FREE SERPL-MCNC: 23.2 MG/L (ref 5.7–26.3)
LYMPHOCYTES # BLD AUTO: 2.8 X10E3/UL (ref 0.7–3.1)
LYMPHOCYTES NFR BLD AUTO: 43 %
M PROTEIN MFR UR ELPH: NORMAL %
M PROTEIN SERPL ELPH-MCNC: 0.2 G/DL
MCH RBC QN AUTO: 29 PG (ref 26.6–33)
MCHC RBC AUTO-ENTMCNC: 33.2 G/DL (ref 31.5–35.7)
MCV RBC AUTO: 87 FL (ref 79–97)
MONOCYTES # BLD AUTO: 0.5 X10E3/UL (ref 0.1–0.9)
MONOCYTES NFR BLD AUTO: 8 %
NEUTROPHILS # BLD AUTO: 2.9 X10E3/UL (ref 1.4–7)
NEUTROPHILS NFR BLD AUTO: 45 %
PHOSPHATE SERPL-MCNC: 3.4 MG/DL (ref 2.5–4.5)
PLATELET # BLD AUTO: 276 X10E3/UL (ref 150–379)
PLEASE NOTE, 011150: ABNORMAL
PLEASE NOTE:, 133800: NORMAL
POTASSIUM SERPL-SCNC: 4.5 MMOL/L (ref 3.5–5.2)
PROT SERPL-MCNC: 6.8 G/DL (ref 6–8.5)
PROT UR-MCNC: 16.2 MG/DL
RBC # BLD AUTO: 4.2 X10E6/UL (ref 3.77–5.28)
SODIUM SERPL-SCNC: 143 MMOL/L (ref 134–144)
WBC # BLD AUTO: 6.5 X10E3/UL (ref 3.4–10.8)

## 2018-03-22 ENCOUNTER — OFFICE VISIT (OUTPATIENT)
Dept: ONCOLOGY | Age: 68
End: 2018-03-22

## 2018-03-22 VITALS
DIASTOLIC BLOOD PRESSURE: 72 MMHG | BODY MASS INDEX: 41.12 KG/M2 | RESPIRATION RATE: 20 BRPM | OXYGEN SATURATION: 96 % | HEART RATE: 57 BPM | SYSTOLIC BLOOD PRESSURE: 131 MMHG | HEIGHT: 67 IN | WEIGHT: 262 LBS | TEMPERATURE: 96.3 F

## 2018-03-22 DIAGNOSIS — E66.01 OBESITY, MORBID (HCC): ICD-10-CM

## 2018-03-22 DIAGNOSIS — D47.2 MGUS (MONOCLONAL GAMMOPATHY OF UNKNOWN SIGNIFICANCE): Primary | ICD-10-CM

## 2018-03-22 NOTE — PROGRESS NOTES
[facility] [place] at [place] [address]  [address] [facility], [address] [facility] [address] [facility]  Rafy Ramirezty: [phone]  F: [phone]      Reason for Visit:   Sandi Maharaj is a 80 y.o. female who is seen for follow up of MGUS. History of Present Illness:   She reports frequent UTIs persist, at least monthly. She continues with chronic back pain, unchanged. No new bone pain. Energy fair, mild fatigue. Occasional GANT, mild as well. Denies any new issues since last here. Still goes dancing about once a month with her . PAST HISTORY: The following sections were reviewed and updated in the EMR as appropriate: PMH, SH, FH, Medications, Allergies. Allergies   Allergen Reactions    Latex Rash    Lisinopril Cough    Lisinopril Swelling     Reports swelling in mouth    Oxycodone Nausea and Vomiting      Review of Systems: A complete review of systems was obtained, reviewed, and scanned into the EMR. Pertinent findings reviewed above. Physical Exam:     Visit Vitals    /72 (BP 1 Location: Right arm, BP Patient Position: Sitting)    Pulse (!) 57    Temp 96.3 °F (35.7 °C) (Temporal)    Resp 20    Ht 5' 7\" (1.702 m)    Wt 262 lb (118.8 kg)    SpO2 96%    BMI 41.04 kg/m2     General: No distress  Eyes: PERRLA, anicteric sclerae  HENT: Atraumatic, OP clear  Neck: Supple  Lymphatic: No cervical, supraclavicular, or inguinal adenopathy  Respiratory: CTAB, normal respiratory effort  CV: Normal rate, regular rhythm, no murmurs, no peripheral edema  GI: Soft, nontender, nondistended, no masses, no hepatomegaly, no splenomegaly  MS: Normal gait and station. Digits without clubbing or cyanosis. Skin: No rashes, ecchymoses, or petechiae. Normal temperature, turgor, and texture.   Psych: Alert, oriented, appropriate affect, normal judgment/insight    Results:     Lab Results   Component Value Date/Time    WBC 6.5 03/07/2018 02:55 PM    HGB 12.2 03/07/2018 02:55 PM    HCT 36.7 03/07/2018 02:55 PM    PLATELET 800 75/50/8512 02:55 PM    MCV 87 03/07/2018 02:55 PM    ABS. NEUTROPHILS 2.9 03/07/2018 02:55 PM     Lab Results   Component Value Date/Time    Sodium 143 03/07/2018 02:55 PM    Potassium 4.5 03/07/2018 02:55 PM    Chloride 104 03/07/2018 02:55 PM    CO2 25 03/07/2018 02:55 PM    Glucose 109 (H) 03/07/2018 02:55 PM    BUN 13 03/07/2018 02:55 PM    Creatinine 1.27 (H) 03/07/2018 02:55 PM    GFR est AA 50 (L) 03/07/2018 02:55 PM    GFR est non-AA 44 (L) 03/07/2018 02:55 PM    Calcium 9.4 03/07/2018 02:55 PM     Lab Results   Component Value Date/Time    Bilirubin, total 0.3 05/31/2009 09:15 PM    ALT (SGPT) 40 05/31/2009 09:15 PM    AST (SGOT) 9 (L) 05/31/2009 09:15 PM    Alk.  phosphatase 92 05/31/2009 09:15 PM    Protein, total 6.8 03/07/2018 02:55 PM    Albumin 4.2 03/07/2018 02:55 PM    Globulin 3.6 05/31/2009 09:15 PM     Lab Results   Component Value Date/Time    Vitamin B12 303 06/02/2009 03:20 AM     (H) 09/07/2017 02:32 PM    Beta-2 Microglobulin, serum 2.7 (H) 09/07/2017 02:32 PM    TSH 2.43 06/02/2009 03:20 AM    M-Fitz 0.2 (H) 03/07/2018 02:55 PM    M-Fitz 0.2 (H) 09/07/2017 02:32 PM     Lab Results   Component Value Date/Time    INR 1.0 05/31/2009 09:15 PM    aPTT 25.9 05/31/2009 09:15 PM     M-SPIKE  Recent Labs      03/07/18   1455  09/07/17   1432   PE6T   --   0.2*   SPE6L  0.2*   --        Free Kappa Light Chains  Recent Labs      03/07/18   1455  09/07/17   1432   KLFL1L  18.9  17.6       Free Lambda Light Chains  Recent Labs      03/07/18   1455 09/07/17   1432   KLFL2L  23.2  23.4       Light Chain Ratio  Recent Labs      03/07/18 1455 09/07/17   1432   KLFL3L  0.81  0.75       UPEP M-spike  Recent Labs      03/07/18 1455 09/11/17   1000   IEU8L   --   Not Observed   UPE6T  Not Observed   --        5/9/2017  Creatinine: 1.31  Calcium: 9.3    7/19/2017  UA: protein 2+  Free kappa light chains: 20.4 (H)  Free lambda light chains: 23.8 (H)  Light chain ratio: 0.86 (normal)  Immunoglobulins: normal  LUZ MARIA: IgG monoclonal protein with lambda light chain specificity  Creatinine: 1.26  Calcium: 9.7        Assessment:   1) MGUS, IgG  Labs show IgG M-spike 0.2. UPEP and serum free light chains are normal. Bone survey normal.  No anemia, hypercalcemia, or significant renal dysfunction. This is likely a benign MGUS. With low-level IgG m-spike and no other abnormalities, a bone marrow biopsy is not indicated. However, she is at risk for progression to myeloma in the future (1% per year), and her labs should be followed. Labs remain overall stable. I will reduce to yearly checks. 2) CKD  Creatinine up slightly on my labs, though ok on labs from PCP last month. Following with nephrology. 3) Proteinuria  Normal UPEP. Nephrology following. 4) Recurrent UTI  Does not appear to be a hematologic issue. She is following with urology. 5) Morbid obesity  Body mass index is 41.04 kg/(m^2). Counseled on importance of weight loss.       Plan:     · Labs in 12 months: CBC, Renal function panel, SPEP, FLC, spot UPEP ([facility])  · Return to clinic in 12 months      Signed By: Abagail Kehr, MD

## 2019-03-08 LAB
ALBUMIN MFR UR ELPH: 67.4 %
ALBUMIN SERPL ELPH-MCNC: 3.8 G/DL (ref 2.9–4.4)
ALBUMIN SERPL-MCNC: 4.2 G/DL (ref 3.6–4.8)
ALBUMIN/GLOB SERPL: 1.4 {RATIO} (ref 0.7–1.7)
ALPHA1 GLOB MFR UR ELPH: 0.9 %
ALPHA1 GLOB SERPL ELPH-MCNC: 0.2 G/DL (ref 0–0.4)
ALPHA2 GLOB MFR UR ELPH: 3.4 %
ALPHA2 GLOB SERPL ELPH-MCNC: 0.7 G/DL (ref 0.4–1)
B-GLOBULIN MFR UR ELPH: 20.1 %
B-GLOBULIN SERPL ELPH-MCNC: 1.1 G/DL (ref 0.7–1.3)
BASOPHILS # BLD AUTO: 0.1 X10E3/UL (ref 0–0.2)
BASOPHILS NFR BLD AUTO: 1 %
BUN SERPL-MCNC: 11 MG/DL (ref 8–27)
BUN/CREAT SERPL: 11 (ref 12–28)
CALCIUM SERPL-MCNC: 9.1 MG/DL (ref 8.7–10.3)
CHLORIDE SERPL-SCNC: 104 MMOL/L (ref 96–106)
CO2 SERPL-SCNC: 24 MMOL/L (ref 20–29)
CREAT SERPL-MCNC: 1.03 MG/DL (ref 0.57–1)
EOSINOPHIL # BLD AUTO: 0.1 X10E3/UL (ref 0–0.4)
EOSINOPHIL NFR BLD AUTO: 2 %
ERYTHROCYTE [DISTWIDTH] IN BLOOD BY AUTOMATED COUNT: 15.3 % (ref 12.3–15.4)
GAMMA GLOB MFR UR ELPH: 8.3 %
GAMMA GLOB SERPL ELPH-MCNC: 0.7 G/DL (ref 0.4–1.8)
GLOBULIN SER CALC-MCNC: 2.7 G/DL (ref 2.2–3.9)
GLUCOSE SERPL-MCNC: 93 MG/DL (ref 65–99)
HCT VFR BLD AUTO: 37.2 % (ref 34–46.6)
HGB BLD-MCNC: 12.3 G/DL (ref 11.1–15.9)
IMM GRANULOCYTES # BLD AUTO: 0 X10E3/UL (ref 0–0.1)
IMM GRANULOCYTES NFR BLD AUTO: 0 %
KAPPA LC FREE SER-MCNC: 21.9 MG/L (ref 3.3–19.4)
KAPPA LC FREE/LAMBDA FREE SER: 1.1 {RATIO} (ref 0.26–1.65)
LAMBDA LC FREE SERPL-MCNC: 19.9 MG/L (ref 5.7–26.3)
LYMPHOCYTES # BLD AUTO: 2.7 X10E3/UL (ref 0.7–3.1)
LYMPHOCYTES NFR BLD AUTO: 40 %
M PROTEIN MFR UR ELPH: NORMAL %
M PROTEIN SERPL ELPH-MCNC: 0.2 G/DL
MCH RBC QN AUTO: 28.3 PG (ref 26.6–33)
MCHC RBC AUTO-ENTMCNC: 33.1 G/DL (ref 31.5–35.7)
MCV RBC AUTO: 86 FL (ref 79–97)
MONOCYTES # BLD AUTO: 0.8 X10E3/UL (ref 0.1–0.9)
MONOCYTES NFR BLD AUTO: 11 %
NEUTROPHILS # BLD AUTO: 3.2 X10E3/UL (ref 1.4–7)
NEUTROPHILS NFR BLD AUTO: 46 %
PHOSPHATE SERPL-MCNC: 3.5 MG/DL (ref 2.5–4.5)
PLATELET # BLD AUTO: 263 X10E3/UL (ref 150–379)
PLEASE NOTE, 011150: ABNORMAL
PLEASE NOTE:, 133800: NORMAL
POTASSIUM SERPL-SCNC: 4.2 MMOL/L (ref 3.5–5.2)
PROT SERPL-MCNC: 6.5 G/DL (ref 6–8.5)
PROT UR-MCNC: 44 MG/DL
RBC # BLD AUTO: 4.34 X10E6/UL (ref 3.77–5.28)
SODIUM SERPL-SCNC: 143 MMOL/L (ref 134–144)
WBC # BLD AUTO: 6.9 X10E3/UL (ref 3.4–10.8)

## 2019-03-18 NOTE — PROGRESS NOTES
[facility] [place] at [address], [address] [place] [address]  Inga Moots: [phone]  F: [phone]      Reason for Visit:   Lenny Villagran is a 77 y.o. female who is seen for follow up of MGUS. History of Present Illness:   She continues with recurrent UTIs. Decided not to see urology, feels like it would be a waste of time as she is convinced that nothing can be done for this. Also had PNA in October, treated as outpatient. Chronic back pain unchanged, no new bone pain. Still goes dancing regularly. PAST HISTORY: The following sections were reviewed and updated in the EMR as appropriate: PMH, SH, FH, Medications, Allergies. Allergies   Allergen Reactions    Latex Rash    Lisinopril Cough    Lisinopril Swelling     Reports swelling in mouth    Oxycodone Nausea and Vomiting      Review of Systems: A complete review of systems was obtained, reviewed, and scanned into the EMR. Pertinent findings reviewed above. Physical Exam:     Visit Vitals  /58 (BP 1 Location: Right arm, BP Patient Position: Sitting)   Pulse 63   Temp 96.9 °F (36.1 °C) (Temporal)   Resp 16   Ht 5' 7\" (1.702 m)   Wt 264 lb (119.7 kg)   SpO2 95%   BMI 41.35 kg/m²     General: No distress  Respiratory: Normal respiratory effort  CV: No peripheral edema  Skin: No rashes, ecchymoses, or petechiae  Psych: Alert, oriented, normal mood/affect      Results:     Lab Results   Component Value Date/Time    WBC 6.9 03/06/2019 11:08 AM    HGB 12.3 03/06/2019 11:08 AM    HCT 37.2 03/06/2019 11:08 AM    PLATELET 479 22/40/3208 11:08 AM    MCV 86 03/06/2019 11:08 AM    ABS.  NEUTROPHILS 3.2 03/06/2019 11:08 AM     Lab Results   Component Value Date/Time    Sodium 143 03/06/2019 11:08 AM    Potassium 4.2 03/06/2019 11:08 AM    Chloride 104 03/06/2019 11:08 AM    CO2 24 03/06/2019 11:08 AM    Glucose 93 03/06/2019 11:08 AM    BUN 11 03/06/2019 11:08 AM    Creatinine 1.03 (H) 03/06/2019 11:08 AM GFR est AA 65 03/06/2019 11:08 AM    GFR est non-AA 56 (L) 03/06/2019 11:08 AM    Calcium 9.1 03/06/2019 11:08 AM     Lab Results   Component Value Date/Time    Bilirubin, total 0.3 05/31/2009 09:15 PM    ALT (SGPT) 40 05/31/2009 09:15 PM    AST (SGOT) 9 (L) 05/31/2009 09:15 PM    Alk. phosphatase 92 05/31/2009 09:15 PM    Protein, total 6.5 03/06/2019 11:08 AM    Albumin 4.2 03/06/2019 11:08 AM    Globulin 3.6 05/31/2009 09:15 PM     Lab Results   Component Value Date/Time    Vitamin B12 303 06/02/2009 03:20 AM     (H) 09/07/2017 02:32 PM    Beta-2 Microglobulin, serum 2.7 (H) 09/07/2017 02:32 PM    TSH 2.43 06/02/2009 03:20 AM    M-Fitz 0.2 (H) 03/06/2019 11:08 AM    M-Fitz 0.2 (H) 09/07/2017 02:32 PM     Lab Results   Component Value Date/Time    INR 1.0 05/31/2009 09:15 PM    aPTT 25.9 05/31/2009 09:15 PM     M-SPIKE  Recent Labs     03/06/19  1108   SPE6L 0.2*       Free Kappa Light Chains  Recent Labs     03/06/19  1108   KLFL1L 21.9*       Free Lambda Light Chains  Recent Labs     03/06/19  1108   KLFL2L 19.9       Light Chain Ratio  Recent Labs     03/06/19  1108   KLFL3L 1.10       UPEP M-spike  Recent Labs     03/06/19  1108   UPE6T Not Observed       5/9/2017  Creatinine: 1.31  Calcium: 9.3    7/19/2017  UA: protein 2+  Free kappa light chains: 20.4 (H)  Free lambda light chains: 23.8 (H)  Light chain ratio: 0.86 (normal)  Immunoglobulins: normal  LUZ MARIA: IgG monoclonal protein with lambda light chain specificity  Creatinine: 1.26  Calcium: 9.7        Assessment:   1) MGUS, IgG  Labs remain overall stable. No evidence of myeloma at this time, though she is at a small risk of progression to myeloma in the future. Continue to monitor labs yearly for now. 2) CKD  Improved on recent labs. Following with nephrology. 3) Proteinuria  Normal UPEP. Nephrology following. 4) Recurrent UTI  Does not appear to be a hematologic issue. She decided not to follow up with urology.     5) Morbid obesity  Body mass index is 41.35 kg/m². Counseled on importance of weight loss.       Plan:     · Labs in 12 months: CBC, Renal function panel, SPEP, FLC, spot UPEP ([facility])  · Return to clinic in 12 months      Signed By: Mare Lassiter MD

## 2019-03-22 ENCOUNTER — OFFICE VISIT (OUTPATIENT)
Dept: ONCOLOGY | Age: 69
End: 2019-03-22

## 2019-03-22 VITALS
RESPIRATION RATE: 16 BRPM | TEMPERATURE: 96.9 F | OXYGEN SATURATION: 95 % | WEIGHT: 264 LBS | HEIGHT: 67 IN | HEART RATE: 63 BPM | SYSTOLIC BLOOD PRESSURE: 133 MMHG | DIASTOLIC BLOOD PRESSURE: 58 MMHG | BODY MASS INDEX: 41.44 KG/M2

## 2019-03-22 DIAGNOSIS — D47.2 MGUS (MONOCLONAL GAMMOPATHY OF UNKNOWN SIGNIFICANCE): Primary | ICD-10-CM

## 2019-03-22 NOTE — PROGRESS NOTES
Krystlezacariasrciki Villagran is a 77 y.o. female follow up for mgus. 1. Have you been to the ER, urgent care clinic since your last visit? Hospitalized since your last visit?no 2. Have you seen or consulted any other health care providers outside of the [address] [place] [address] since your last visit? Include any pap smears or colon screening.  no

## 2019-04-23 ENCOUNTER — APPOINTMENT (OUTPATIENT)
Dept: GENERAL RADIOLOGY | Age: 69
End: 2019-04-23
Attending: PHYSICIAN ASSISTANT
Payer: MEDICARE

## 2019-04-23 ENCOUNTER — HOSPITAL ENCOUNTER (EMERGENCY)
Age: 69
Discharge: HOME | End: 2019-04-24
Attending: EMERGENCY MEDICINE | Admitting: EMERGENCY MEDICINE
Payer: MEDICARE

## 2019-04-23 DIAGNOSIS — R31.9 URINARY TRACT INFECTION WITH HEMATURIA, SITE UNSPECIFIED: Primary | ICD-10-CM

## 2019-04-23 DIAGNOSIS — N39.0 URINARY TRACT INFECTION WITH HEMATURIA, SITE UNSPECIFIED: Primary | ICD-10-CM

## 2019-04-23 LAB
ALBUMIN SERPL-MCNC: 3.8 G/DL (ref 3.5–5)
ALBUMIN/GLOB SERPL: 1.2 {RATIO} (ref 1.1–2.2)
ALP SERPL-CCNC: 92 U/L (ref 45–117)
ALT SERPL-CCNC: 26 U/L (ref 12–78)
ANION GAP SERPL CALC-SCNC: 5 MMOL/L (ref 5–15)
APPEARANCE UR: ABNORMAL
AST SERPL-CCNC: 24 U/L (ref 15–37)
BACTERIA URNS QL MICRO: NEGATIVE /HPF
BASOPHILS # BLD: 0.1 K/UL (ref 0–0.1)
BASOPHILS NFR BLD: 1 % (ref 0–1)
BILIRUB SERPL-MCNC: 0.6 MG/DL (ref 0.2–1)
BILIRUB UR QL: NEGATIVE
BUN SERPL-MCNC: 9 MG/DL (ref 6–20)
BUN/CREAT SERPL: 9 (ref 12–20)
CALCIUM SERPL-MCNC: 9.1 MG/DL (ref 8.5–10.1)
CHLORIDE SERPL-SCNC: 106 MMOL/L (ref 97–108)
CO2 SERPL-SCNC: 29 MMOL/L (ref 21–32)
COLOR UR: ABNORMAL
COMMENT, HOLDF: NORMAL
CREAT SERPL-MCNC: 0.96 MG/DL (ref 0.55–1.02)
DIFFERENTIAL METHOD BLD: ABNORMAL
EOSINOPHIL # BLD: 0 K/UL (ref 0–0.4)
EOSINOPHIL NFR BLD: 0 % (ref 0–7)
EPITH CASTS URNS QL MICRO: ABNORMAL /LPF
ERYTHROCYTE [DISTWIDTH] IN BLOOD BY AUTOMATED COUNT: 13.9 % (ref 11.5–14.5)
FLUAV AG NPH QL IA: NEGATIVE
FLUBV AG NOSE QL IA: NEGATIVE
GLOBULIN SER CALC-MCNC: 3.2 G/DL (ref 2–4)
GLUCOSE SERPL-MCNC: 113 MG/DL (ref 65–100)
GLUCOSE UR STRIP.AUTO-MCNC: NEGATIVE MG/DL
HCT VFR BLD AUTO: 38.3 % (ref 35–47)
HGB BLD-MCNC: 12.5 G/DL (ref 11.5–16)
HGB UR QL STRIP: NEGATIVE
HYALINE CASTS URNS QL MICRO: ABNORMAL /LPF (ref 0–5)
IMM GRANULOCYTES # BLD AUTO: 0 K/UL (ref 0–0.04)
IMM GRANULOCYTES NFR BLD AUTO: 1 % (ref 0–0.5)
KETONES UR QL STRIP.AUTO: NEGATIVE MG/DL
LEUKOCYTE ESTERASE UR QL STRIP.AUTO: ABNORMAL
LYMPHOCYTES # BLD: 1.6 K/UL (ref 0.8–3.5)
LYMPHOCYTES NFR BLD: 19 % (ref 12–49)
MCH RBC QN AUTO: 28.7 PG (ref 26–34)
MCHC RBC AUTO-ENTMCNC: 32.6 G/DL (ref 30–36.5)
MCV RBC AUTO: 88 FL (ref 80–99)
MONOCYTES # BLD: 0.7 K/UL (ref 0–1)
MONOCYTES NFR BLD: 8 % (ref 5–13)
NEUTS SEG # BLD: 5.7 K/UL (ref 1.8–8)
NEUTS SEG NFR BLD: 71 % (ref 32–75)
NITRITE UR QL STRIP.AUTO: NEGATIVE
NRBC # BLD: 0 K/UL (ref 0–0.01)
NRBC BLD-RTO: 0 PER 100 WBC
PH UR STRIP: 6.5 [PH] (ref 5–8)
PLATELET # BLD AUTO: 228 K/UL (ref 150–400)
PMV BLD AUTO: 10.4 FL (ref 8.9–12.9)
POTASSIUM SERPL-SCNC: 4 MMOL/L (ref 3.5–5.1)
PROT SERPL-MCNC: 7 G/DL (ref 6.4–8.2)
PROT UR STRIP-MCNC: NEGATIVE MG/DL
RBC # BLD AUTO: 4.35 M/UL (ref 3.8–5.2)
RBC #/AREA URNS HPF: ABNORMAL /HPF (ref 0–5)
SAMPLES BEING HELD,HOLD: NORMAL
SODIUM SERPL-SCNC: 140 MMOL/L (ref 136–145)
SP GR UR REFRACTOMETRY: 1.01 (ref 1–1.03)
UR CULT HOLD, URHOLD: NORMAL
UROBILINOGEN UR QL STRIP.AUTO: 0.2 EU/DL (ref 0.2–1)
WBC # BLD AUTO: 8.1 K/UL (ref 3.6–11)
WBC URNS QL MICRO: ABNORMAL /HPF (ref 0–4)

## 2019-04-23 PROCEDURE — 96361 HYDRATE IV INFUSION ADD-ON: CPT

## 2019-04-23 PROCEDURE — 96375 TX/PRO/DX INJ NEW DRUG ADDON: CPT

## 2019-04-23 PROCEDURE — 74011250636 HC RX REV CODE- 250/636: Performed by: EMERGENCY MEDICINE

## 2019-04-23 PROCEDURE — 36415 COLL VENOUS BLD VENIPUNCTURE: CPT

## 2019-04-23 PROCEDURE — 85025 COMPLETE CBC W/AUTO DIFF WBC: CPT

## 2019-04-23 PROCEDURE — 81001 URINALYSIS AUTO W/SCOPE: CPT

## 2019-04-23 PROCEDURE — 80053 COMPREHEN METABOLIC PANEL: CPT

## 2019-04-23 PROCEDURE — 87804 INFLUENZA ASSAY W/OPTIC: CPT

## 2019-04-23 PROCEDURE — 99283 EMERGENCY DEPT VISIT LOW MDM: CPT

## 2019-04-23 PROCEDURE — 96374 THER/PROPH/DIAG INJ IV PUSH: CPT

## 2019-04-23 PROCEDURE — 71046 X-RAY EXAM CHEST 2 VIEWS: CPT

## 2019-04-23 RX ORDER — ONDANSETRON 2 MG/ML
4 INJECTION INTRAMUSCULAR; INTRAVENOUS
Status: COMPLETED | OUTPATIENT
Start: 2019-04-23 | End: 2019-04-23

## 2019-04-23 RX ORDER — KETOROLAC TROMETHAMINE 30 MG/ML
15 INJECTION, SOLUTION INTRAMUSCULAR; INTRAVENOUS
Status: COMPLETED | OUTPATIENT
Start: 2019-04-23 | End: 2019-04-23

## 2019-04-23 RX ORDER — CEPHALEXIN 500 MG/1
500 CAPSULE ORAL 3 TIMES DAILY
Qty: 21 CAP | Refills: 0 | Status: SHIPPED | OUTPATIENT
Start: 2019-04-23 | End: 2019-04-30

## 2019-04-23 RX ADMIN — KETOROLAC TROMETHAMINE 15 MG: 30 INJECTION, SOLUTION INTRAMUSCULAR at 23:39

## 2019-04-23 RX ADMIN — ONDANSETRON 4 MG: 2 INJECTION INTRAMUSCULAR; INTRAVENOUS at 23:39

## 2019-04-23 RX ADMIN — SODIUM CHLORIDE 1000 ML: 900 INJECTION, SOLUTION INTRAVENOUS at 23:39

## 2019-04-24 VITALS
OXYGEN SATURATION: 98 % | RESPIRATION RATE: 18 BRPM | BODY MASS INDEX: 40.81 KG/M2 | SYSTOLIC BLOOD PRESSURE: 170 MMHG | WEIGHT: 260 LBS | HEART RATE: 80 BPM | HEIGHT: 67 IN | DIASTOLIC BLOOD PRESSURE: 80 MMHG | TEMPERATURE: 100.5 F

## 2019-04-24 NOTE — DISCHARGE INSTRUCTIONS

## 2019-04-24 NOTE — ED PROVIDER NOTES
77 y.o. female with past medical history significant for hypercholesterolemia, gout, GERD, depression, HTN, and h/o headaches presents ambulatory and accompanied by  with chief complaint of a headaches, sore throat, and a dry cough that all started yesterday. Pt reports associated wheezing, chills, fever of 100.5 F (measured just PTA), and nausea. Pt includes that she has been managing her symptoms with Ibuprofen, noting that her last dose was this morning with minimal relief. She also notes that she has seasonal allergies, and that she has been using an albuterol inhaler and OTC allergy medications with minimal relief. Pt denies any history of asthma. She denies having used any nasal sprays. Pt denies any vomiting, diarrhea, or any other symptoms at this time. There are no other acute medical concerns at this time. Social hx: Patient denies Tobacco use. Denies EtOH use. Denies illicit drug abuse. PCP: Olivia Whitaker MD 
 
Note written by Mandi Solis, as dictated by Clari Figueroa MD 11:28 PM 
 
 
The history is provided by the patient. No  was used. Past Medical History:  
Diagnosis Date  Acid reflux  Constipation  Depression  Dizziness  Edema  Gout  Headache  Hypercholesteremia  Hypertension  Joint pain  Joint swelling  Sinus problem  SOB (shortness of breath)  Swallowing difficulty Past Surgical History:  
Procedure Laterality Date  HX BREAST REDUCTION    
 HX CARPAL TUNNEL RELEASE  HX CHOLECYSTECTOMY  HX HYSTERECTOMY  HX HYSTERECTOMY  2010  HX KNEE REPLACEMENT Bilateral   
 HX KNEE REPLACEMENT Bilateral 2008  HX ROTATOR CUFF REPAIR    
 HX ROTATOR CUFF REPAIR  2015 Family History:  
Problem Relation Age of Onset  Other Sister Brain Tumor  Arthritis-osteo Paternal Aunt  Stroke Maternal Grandmother  Other Daughter AML 2010 Social History Socioeconomic History  Marital status:  Spouse name: Not on file  Number of children: Not on file  Years of education: Not on file  Highest education level: Not on file Occupational History  Not on file Social Needs  Financial resource strain: Not on file  Food insecurity:  
  Worry: Not on file Inability: Not on file  Transportation needs:  
  Medical: Not on file Non-medical: Not on file Tobacco Use  Smoking status: Never Smoker  Smokeless tobacco: Never Used Substance and Sexual Activity  Alcohol use: No  
 Drug use: No  
 Sexual activity: Yes Lifestyle  Physical activity:  
  Days per week: Not on file Minutes per session: Not on file  Stress: Not on file Relationships  Social connections:  
  Talks on phone: Not on file Gets together: Not on file Attends [employer] service: Not on file Active member of club or organization: Not on file Attends meetings of clubs or organizations: Not on file Relationship status: Not on file  Intimate partner violence:  
  Fear of current or ex partner: Not on file Emotionally abused: Not on file Physically abused: Not on file Forced sexual activity: Not on file Other Topics Concern  Not on file Social History Narrative ** Merged History Encounter ** ALLERGIES: Latex; Lisinopril; Lisinopril; Lyrica [pregabalin]; Niacin; and Oxycodone Review of Systems Constitutional: Positive for chills and fever. Negative for appetite change. HENT: Positive for sore throat. Negative for congestion and nosebleeds. Eyes: Negative for discharge. Respiratory: Positive for cough (dry) and wheezing. Negative for shortness of breath. Cardiovascular: Negative for chest pain. Gastrointestinal: Positive for nausea. Negative for abdominal pain, diarrhea and vomiting. Genitourinary: Negative for dysuria. Musculoskeletal: Negative. Skin: Negative for rash. Neurological: Positive for headaches. Negative for weakness. Hematological: Negative for adenopathy. Psychiatric/Behavioral: Negative. All other systems reviewed and are negative. Vitals:  
 04/23/19 2112 BP: 190/86 Pulse: 77 Resp: 17 Temp: (!) 100.5 °F (38.1 °C) SpO2: 94% Weight: 117.9 kg (260 lb) Height: 5' 7\" (1.702 m) Physical Exam  
Constitutional: She is oriented to person, place, and time. She appears well-developed and well-nourished. HENT:  
Head: Normocephalic and atraumatic. Right Ear: Tympanic membrane and external ear normal.  
Left Ear: Tympanic membrane and external ear normal.  
Nose: Nose normal.  
Mouth/Throat: Uvula is midline. Posterior oropharyngeal erythema present. No oropharyngeal exudate. Eyes: Conjunctivae, EOM and lids are normal. Right eye exhibits no discharge. Left eye exhibits no discharge. Neck: Normal range of motion. Neck supple. No tracheal deviation present. No thyromegaly present. Cardiovascular: Normal rate, regular rhythm, normal heart sounds and intact distal pulses. Pulmonary/Chest: Effort normal and breath sounds normal. No stridor. She has no decreased breath sounds. Abdominal: Soft. Normal appearance and bowel sounds are normal. There is no tenderness. Musculoskeletal: Normal range of motion. She exhibits no edema or tenderness. Neurological: She is alert and oriented to person, place, and time. Skin: Skin is warm and dry. Psychiatric: She has a normal mood and affect. Her behavior is normal. Judgment normal.  
Nursing note and vitals reviewed. Note written by Jaz Payan, as dictated by Shani Arambula MD 11:28 PM  
 
MDM Number of Diagnoses or Management Options Urinary tract infection with hematuria, site unspecified:  
Diagnosis management comments: Assesment/Plan- 77 y.o. Patient presents with: 
Generalized Body Aches Headache Wheezing differential includes: URI, Flu, pneumonia, uti. Labs and imaging reviewed with urine analysis consistent with UTI. Patient is well appearing, afebrile and tolerating PO. Recommend PCP follow up. Patient educated on reasons to return to the ED. Procedures 12:15 AM 
The patient has been updated on results, and has been planned for discharge. Instructed the patient to follow-up with PCP, and to return to the ED should any new symptoms arise or worsen. Patient agreeable to plan. 12:15 AM 
Patient's results have been reviewed with them. Patient and/or family have verbally conveyed their understanding and agreement of the patient's signs, symptoms, diagnosis, treatment and prognosis and additionally agree to follow up as recommended or return to the Emergency Room should their condition change prior to follow-up. Discharge instructions have also been provided to the patient with some educational information regarding their diagnosis as well a list of reasons why they would want to return to the ER prior to their follow-up appointment should their condition change.